# Patient Record
Sex: FEMALE | Race: OTHER | HISPANIC OR LATINO
[De-identification: names, ages, dates, MRNs, and addresses within clinical notes are randomized per-mention and may not be internally consistent; named-entity substitution may affect disease eponyms.]

---

## 2021-12-17 ENCOUNTER — APPOINTMENT (OUTPATIENT)
Dept: ANTEPARTUM | Facility: CLINIC | Age: 26
End: 2021-12-17
Payer: MEDICAID

## 2021-12-17 ENCOUNTER — APPOINTMENT (OUTPATIENT)
Dept: MATERNAL FETAL MEDICINE | Facility: CLINIC | Age: 26
End: 2021-12-17

## 2021-12-17 ENCOUNTER — ASOB RESULT (OUTPATIENT)
Age: 26
End: 2021-12-17

## 2021-12-17 VITALS
BODY MASS INDEX: 23.19 KG/M2 | SYSTOLIC BLOOD PRESSURE: 112 MMHG | HEIGHT: 62 IN | HEART RATE: 60 BPM | RESPIRATION RATE: 18 BRPM | WEIGHT: 126 LBS | OXYGEN SATURATION: 99 % | DIASTOLIC BLOOD PRESSURE: 66 MMHG

## 2021-12-17 DIAGNOSIS — Z78.9 OTHER SPECIFIED HEALTH STATUS: ICD-10-CM

## 2021-12-17 DIAGNOSIS — Z86.39 PERSONAL HISTORY OF OTHER ENDOCRINE, NUTRITIONAL AND METABOLIC DISEASE: ICD-10-CM

## 2021-12-17 DIAGNOSIS — Z92.89 PERSONAL HISTORY OF OTHER MEDICAL TREATMENT: ICD-10-CM

## 2021-12-17 PROBLEM — Z00.00 ENCOUNTER FOR PREVENTIVE HEALTH EXAMINATION: Status: ACTIVE | Noted: 2021-12-17

## 2021-12-17 PROCEDURE — 76801 OB US < 14 WKS SINGLE FETUS: CPT

## 2021-12-17 RX ORDER — PRENATAL VIT NO.126/IRON/FOLIC 28MG-0.8MG
28-0.8 TABLET ORAL
Refills: 0 | Status: ACTIVE | COMMUNITY

## 2021-12-27 ENCOUNTER — APPOINTMENT (OUTPATIENT)
Dept: ANTEPARTUM | Facility: CLINIC | Age: 26
End: 2021-12-27
Payer: MEDICAID

## 2021-12-27 ENCOUNTER — APPOINTMENT (OUTPATIENT)
Dept: MATERNAL FETAL MEDICINE | Facility: CLINIC | Age: 26
End: 2021-12-27
Payer: MEDICAID

## 2021-12-27 ENCOUNTER — ASOB RESULT (OUTPATIENT)
Age: 26
End: 2021-12-27

## 2021-12-27 VITALS
BODY MASS INDEX: 22.82 KG/M2 | RESPIRATION RATE: 18 BRPM | HEIGHT: 62 IN | DIASTOLIC BLOOD PRESSURE: 60 MMHG | OXYGEN SATURATION: 99 % | WEIGHT: 124 LBS | SYSTOLIC BLOOD PRESSURE: 100 MMHG | HEART RATE: 63 BPM

## 2021-12-27 DIAGNOSIS — Z64.1 PROBLEMS RELATED TO MULTIPARITY: ICD-10-CM

## 2021-12-27 DIAGNOSIS — O34.219 MATERNAL CARE FOR UNSPECIFIED TYPE SCAR FROM PREVIOUS CESAREAN DELIVERY: ICD-10-CM

## 2021-12-27 PROCEDURE — ZZZZZ: CPT

## 2021-12-27 PROCEDURE — 76813 OB US NUCHAL MEAS 1 GEST: CPT

## 2021-12-27 PROCEDURE — 99205 OFFICE O/P NEW HI 60 MIN: CPT

## 2021-12-27 RX ORDER — ASPIRIN ENTERIC COATED TABLETS 81 MG 81 MG/1
81 TABLET, DELAYED RELEASE ORAL
Qty: 30 | Refills: 4 | Status: ACTIVE | COMMUNITY
Start: 2021-12-27 | End: 1900-01-01

## 2021-12-27 NOTE — CHIEF COMPLAINT
[G ___] : G [unfilled] [P ___] : P [unfilled] [de-identified] : history of preeclampsia,  delivery and  deliveries

## 2021-12-27 NOTE — VITALS
[LMP (date): ___] : LMP was on [unfilled] [GA =___ Weeks] : which calculates to a GA of [unfilled] weeks [KIRSTIN by LMP (date): ___] : The calculated KIRSTIN by LMP is [unfilled] [By LMP] : this is the final KIRSTIN [GA= ___ Days] : and [unfilled] day(s)

## 2021-12-27 NOTE — OB HISTORY
[Pregnancy History] : boy [LMP: ___] : LMP: [unfilled] [KIRSTIN: ___] : KIRSTIN: [unfilled] [EGA: ___ wks] : EGA: [unfilled] wks [Spontaneous] : Spontaneous conception [Definite:  ___ (Date)] : the last menstrual period was [unfilled] [Normal Amount/Duration] : was of a normal amount and duration [Regular Cycle Intervals] : periods have been regular [___] : Delivery occurred at [unfilled] [FreeTextEntry1] : Prenatal records were not available for review.  [Spotting Between  Menses] : no spotting between menses

## 2021-12-27 NOTE — DISCUSSION/SUMMARY
[FreeTextEntry1] : She is 13 weeks and 2 day gestation by her last menstrual period dates.\par \par She has risk factors for having preeclampsia during pregnancy. I told her that having preeclampsia in a prior pregnancy has been associated with the recurrence of preeclampsia in future pregnancies.  I told her that the risk of having preeclampsia during the second pregnancy after having preeclampsia in the first pregnancy is approximately 15%.  I told her that taking a baby aspirin during pregnancy has been found to reduce the risk of developing preeclampsia,  delivery, and fetal growth restriction. She was advised to take one baby aspirin daily. I also told her that she can alternate taking a baby aspirin one day and two baby aspirins the next day. I also told her to stop taking the baby aspirin once the pregnancy reaches 37 weeks gestation.\par \par She had a  delivery after abdominal trauma in her native country of [place] [facility].  She subsequently had 2 full-term deliveries.  I told her that she is unlikely to have another  delivery since the cause of the  birth was attributed to abdominal trauma and it is unlikely to recur during the current pregnancy.  I informed her that spontaneous  births are multifactorial since various conditions such as  labor,  rupture of membranes, multiple gestations, abruptio placenta, placenta previa, vaginal bleeding, increased or decreased amniotic fluid volume, fetal anomalies, chorioamnionitis, and incompetent cervix are often associated with  births. The clinical risk factors most often associated with spontaneous  birth are genital tract infection, vaginal bleeding during the second trimester, multiple gestations, and a history of previous  birth.  There are four pathologic pathways that have been associated with  labor and delivery.  The four pathways are maternal infection, maternal or fetal stress, overdistention of the uterus, and decidual hemorrhage.  She was advised to not have sexual intercourse or to use a condom during intercourse to reduce the chance of having another  delivery. She was advised to limit her activities. I told her to avoid standing for prolonged periods of time, avoid heavy lifting and bending. She was also advised to avoid stress during the course of the pregnancy. I gave her  labor precautions. She was advised to present to the hospital if she developed pelvic pressure, back pain, copious vaginal discharge, leaking of vaginal fluid, vaginal bleeding, abdominal tightening, and abdominal cramping pain.\par \par She was informed of the availability of weekly progesterone injections to reduce her risk of having another premature infant. She was told that the progesterone injections did not provide complete protection against prematurity.  She was informed that the progesterone injections could produce mild side effects such as injection site soreness, swelling or bruising. I told her that the progesterone injections are usually started between 16 to 20 weeks of gestation and are continued up to 36 weeks of gestation or delivery. I also discussed the results of the Progestin's Role in Optimizing  Gestation (PROLONG) trial which was a double blind, placebo controlled, and international trial enrolling over 1,700 women. The rate of  births < 35 weeks did not differ between the progesterone and placebo groups (11% vs 11.5%; p = 0.7). The rate of  births < 37 weeks was also not significantly different between the progesterone and placebo groups (23.1% vs 21.9%). She was made aware that some medical insurance companies do not cover the cost of progesterone injections. She declined to receive the progesterone injections.  \par \par She had 3  sections and she was advised to have a repeat  section delivery.  I told her that multiple repeat  deliveries can result in serious maternal morbidity.  I told her that she is at risk for dense adhesions, more difficult surgery, placenta accreta, bowel injury, cystotomy, ureteral injury, ileus, hysterectomy, blood transfusions, intensive care unit admission, and  delivery.\par \par She is multiparous and we discussed the various methods available for contraception after delivery. She told me that she wants to have  an operative sterilization procedure after the repeat  delivery. \par \par She took the first dose of the Pfizer COVID-19 vaccine during 2021.  She did not take the second vaccine because she became pregnant and had concerns about taking the vaccine during pregnancy.  I told her that I recommend having the COVID-19 vaccine during pregnancy. She was informed that the American College of Obstetricians and Gynecologists (ACOG) and the Society for Maternal Fetal Medicine (SMFM) are recommending that all pregnant women be vaccinated against COVID-19. I discussed the benefits and risks of COVID-19 vaccination. I told her of the reported safety of the COVID-19 vaccines during pregnancy. There is no evidence of adverse maternal or fetal effects from vaccinating pregnant women with COVID-19 vaccine. I discussed the current low vaccination rates and the concerning increase in cases. The data has shown that COVID-19 infection puts pregnant women at increased risk of severe complications and death. More than 95% of hospitalized and/or dying individuals from COVID-19 are unvaccinated. Vaccines are the best chance there is to save lives and end the pandemic. Maternal vaccination may also provide some level of protection to the  through the placental transfer of maternal antibodies. I told her that mild side effects such as pain at the site of injection, fever, muscle pain, joint pain, headaches, fatigue, and other symptoms may be present after vaccination. Tylenol (acetaminophen) is recommended for pregnant women who experience fever or other side effects. These side effects are a normal part of the body´s reaction to the vaccine and the developing of antibodies to protect against the COVID-19 illness. None of the COVID-19 vaccines available in the [place] cause infertility. The [employer] COVID-19 vaccine has a small risk for thrombocytopenia syndrome (TTS) and Guillain-Barré syndrome. Therefore, I believe that vaccination with the mRNA vaccines is preferable. There are rare allergic reactions (including anaphylaxis), 4.7 per million for [employer] and 2.5 per million for Moderna following COVID-19 vaccination in nonpregnant individuals. Management of anaphylaxis in pregnant individuals is the same as in nonpregnant individuals. She told me that she is going to request to have the second COVID 19 vaccine as soon as possible.\par \par I told her that I recommend a glucola challenge test to screen for gestational diabetes between 24 and 28 weeks gestation.  I also recommended the Tdap vaccine between 27 and 36 weeks of gestation to prevent pertussis infection in the  infant. I recommended the flu vaccine during flu season (October through May). She told me that she already took the flu vaccine. I recommended having serial ultrasounds to evaluate fetal growth and development.

## 2021-12-27 NOTE — FAMILY HISTORY
[Age 35+ During Pregnancy] : not 35 or over during pregnancy [Reported Family History Of Birth Defects] : no congenital heart defects [Bam-Sachs Carrier] : no Bam-Sachs [Family History] : no mental retardation/autism [Reported Family History Of Genetic Disease] : no history of child defect in child of baby father

## 2022-01-03 LAB
1ST TRIMESTER DATA: NORMAL
ADDENDUM DOC: NORMAL
AFP PNL SERPL: NORMAL
AFP SERPL-ACNC: NORMAL
CLINICAL BIOCHEMIST REVIEW: NORMAL
FREE BETA HCG 1ST TRIMESTER: NORMAL
Lab: NORMAL
NASAL BONE: PRESENT
NOTES NTD: NORMAL
NT: NORMAL
PAPP-A SERPL-ACNC: NORMAL
TRISOMY 18/3: NORMAL

## 2022-01-24 ENCOUNTER — APPOINTMENT (OUTPATIENT)
Dept: ANTEPARTUM | Facility: CLINIC | Age: 27
End: 2022-01-24
Payer: MEDICAID

## 2022-01-24 PROCEDURE — 36415 COLL VENOUS BLD VENIPUNCTURE: CPT

## 2022-01-28 LAB
1ST TRIMESTER DATA: NORMAL
2ND TRIMESTER DATA: NORMAL
ADDENDUM DOC: NORMAL
AFP PNL SERPL: NORMAL
AFP SERPL-ACNC: NORMAL
AFP SERPL-ACNC: NORMAL
B-HCG FREE SERPL-MCNC: NORMAL
CLINICAL BIOCHEMIST REVIEW: NORMAL
FREE BETA HCG 1ST TRIMESTER: NORMAL
INHIBIN A SERPL-MCNC: NORMAL
NASAL BONE: PRESENT
NOTES NTD: NORMAL
NT: NORMAL
PAPP-A SERPL-ACNC: NORMAL
U ESTRIOL SERPL-SCNC: NORMAL

## 2022-02-16 ENCOUNTER — APPOINTMENT (OUTPATIENT)
Dept: MATERNAL FETAL MEDICINE | Facility: CLINIC | Age: 27
End: 2022-02-16
Payer: MEDICAID

## 2022-02-16 ENCOUNTER — APPOINTMENT (OUTPATIENT)
Dept: ANTEPARTUM | Facility: CLINIC | Age: 27
End: 2022-02-16
Payer: MEDICAID

## 2022-02-16 ENCOUNTER — ASOB RESULT (OUTPATIENT)
Age: 27
End: 2022-02-16

## 2022-02-16 VITALS
BODY MASS INDEX: 24.29 KG/M2 | OXYGEN SATURATION: 99 % | HEIGHT: 62 IN | HEART RATE: 74 BPM | WEIGHT: 132 LBS | RESPIRATION RATE: 18 BRPM | DIASTOLIC BLOOD PRESSURE: 60 MMHG | SYSTOLIC BLOOD PRESSURE: 110 MMHG

## 2022-02-16 DIAGNOSIS — Z71.85 ENCOUNTER FOR IMMUNIZATION SAFETY COUNSELING: ICD-10-CM

## 2022-02-16 DIAGNOSIS — O09.899 ABNORMAL HEMATOLOGICAL FINDING ON ANTENATAL SCREENING OF MOTHER: ICD-10-CM

## 2022-02-16 DIAGNOSIS — Z3A.20 20 WEEKS GESTATION OF PREGNANCY: ICD-10-CM

## 2022-02-16 DIAGNOSIS — O09.299 SUPERVISION OF PREGNANCY WITH OTHER POOR REPRODUCTIVE OR OBSTETRIC HISTORY, UNSPECIFIED TRIMESTER: ICD-10-CM

## 2022-02-16 DIAGNOSIS — O09.899 SUPERVISION OF OTHER HIGH RISK PREGNANCIES, UNSPECIFIED TRIMESTER: ICD-10-CM

## 2022-02-16 DIAGNOSIS — O28.0 ABNORMAL HEMATOLOGICAL FINDING ON ANTENATAL SCREENING OF MOTHER: ICD-10-CM

## 2022-02-16 DIAGNOSIS — O35.8XX0 MATERNAL CARE FOR OTHER (SUSPECTED) FETAL ABNORMALITY AND DAMAGE, NOT APPLICABLE OR UNSPECIFIED: ICD-10-CM

## 2022-02-16 PROCEDURE — 76817 TRANSVAGINAL US OBSTETRIC: CPT

## 2022-02-16 PROCEDURE — 99215 OFFICE O/P EST HI 40 MIN: CPT

## 2022-02-16 PROCEDURE — 76811 OB US DETAILED SNGL FETUS: CPT

## 2022-02-16 RX ORDER — GLUC/MSM/COLGN2/HYAL/ANTIARTH3 375-375-20
TABLET ORAL
Refills: 0 | Status: ACTIVE | COMMUNITY

## 2022-02-16 NOTE — DISCUSSION/SUMMARY
[FreeTextEntry1] : She is 20 weeks and 4 days gestation by her last menstrual period dates.\par \par She has risk factors for having preeclampsia during pregnancy. She has been taking a daily baby aspirin to reduce the risk of developing preeclampsia,  delivery, and fetal growth restriction.  I reminded her to stop taking the baby aspirin once the pregnancy reaches 37 weeks gestation.\par \par I discussed the significance of a low IZABELLA-A level. I told her that IZABELLA-A levels equal or less than the 5th percentile have been associated with a 2.7 fold increased risk for having a low birth weight infant. I also told her that there is a 2.4 fold increased risk for having a delivery before 34 weeks gestation, a 3.7 fold increased risk for having preeclampsia during pregnancy, a 3.3 fold increased risk for having a miscarriage or fetal loss before 24 weeks of gestation and a 1.9 fold increased risk for having a stillbirth or fetal loss at or after 24 weeks of gestation. I told her that  the transvaginal ultrasound examination done today to measure the cervical length was WNL; which is suggestive of a low risk for  labor/delivery. I previously gave her  labor precautions. I again advised her to not have sexual intercourse or to use a condom during intercourse to reduce the chance of having a  delivery. I also reminded her to avoid stress during the course of the pregnancy. I also advised her to continue her daily prenatal vitamins. I encouraged her to continue taking a baby aspirin daily to decreased the risk of developing preeclampsia,  delivery, and a small baby. I also recommended weekly testing of fetal well-being with BPPs/NSTs due to the increased risk for stillbirth starting at 36 weeks of gestation. I also recommended delivery during 39 weeks gestation if she has not given birth before 39 weeks to reduce the risk of late stillbirths. I recommended serial ultrasounds during the third trimester to monitor fetal growth.\par \par She took the first dose of the Pfizer COVID-19 vaccine during 2021.  She had not taken the second COVID-19 vaccine because she became pregnant and had concerns about taking the vaccine during pregnancy.  I recommended that she take the second COVID-19 vaccine during her prior [facility] visit.  She told me that she had the second COVID-19 vaccine on 2022. She also told me that she is going to request to have the COVID-19 vaccine booster in approximately 6 months.\par \par I told her that today's ultrasound examination reported left-sided fetal pyelectasis.  I told her that pyelectasis between 15 - 20 weeks of gestation is considered an ultrasound marker that has been associated with fetal chromosomal malformations such as Down syndrome. She was informed that ultrasound markers suggestive of fetal aneuploidy like pyelectasis are non-specific, most frequently seen in normal fetuses, and often transient. I told her that there were no major fetal structural malformations or other ultrasound markers suggestive of fetal aneuploidy seen during today's ultrasound examination. She was told that all pregnancies have a 2 to 3% risk of having a baby born with a birth defect. I discussed the various screening and diagnostic tests used for prenatal diagnosis.  I explained the difference between screening and diagnostic tests. I discussed the First Trimester Screen test and Sequential Screen test results. Both tests reported a low risk for fetal chromosomal malformations. She was made aware that prenatal diagnosis is available to determine whether the fetus she is carrying has normal or abnormal chromosomes. She was offered diagnostic testing with a genetic amniocentesis. She was made aware of the small risk of miscarriage associated with the diagnostic procedures, the limitations of the procedures, and the alternative of not having the procedures.  She decided to not have the genetic amniocentesis.

## 2022-02-16 NOTE — CHIEF COMPLAINT
[G ___] : G [unfilled] [P ___] : P [unfilled] [de-identified] : history of preeclampsia,  delivery and  deliveries

## 2022-02-16 NOTE — OB HISTORY
[Pregnancy History] : boy [___] : Delivery occurred at [unfilled] [LMP: ___] : LMP: [unfilled] [KIRSTIN: ___] : KIRSTIN: [unfilled] [Spontaneous] : Spontaneous conception [Definite:  ___ (Date)] : the last menstrual period was [unfilled] [Normal Amount/Duration] : was of a normal amount and duration [Regular Cycle Intervals] : periods have been regular [EGA: ___ wks] : EGA: [unfilled] wks [FreeTextEntry1] : Prenatal records were not available for review. \par \par She had a maternal-fetal medicine consultation on 2021 because of history of preeclampsia, history of  delivery, prior  deliveries, and multiparity.\par \par First trimester screening done on 2021 reported a low risk for Down syndrome and trisomy 18/13. The maternal IZABELLA A level was reported to be low at the 5th percentile. A sequential screen test done on 2022 reported a low risk for Down syndrome, trisomy 18, and open neural tube defects. [Spotting Between  Menses] : no spotting between menses

## 2022-02-16 NOTE — VITALS
[LMP (date): ___] : LMP was on [unfilled] [By LMP] : this is the final KIRSTIN [GA =___ Weeks] : which calculates to a GA of [unfilled] weeks [GA= ___ Days] : and [unfilled] day(s) [KIRSTIN by LMP (date): ___] : The calculated KIRSTIN by LMP is [unfilled]

## 2022-04-13 ENCOUNTER — APPOINTMENT (OUTPATIENT)
Dept: ANTEPARTUM | Facility: CLINIC | Age: 27
End: 2022-04-13
Payer: MEDICAID

## 2022-04-13 ENCOUNTER — ASOB RESULT (OUTPATIENT)
Age: 27
End: 2022-04-13

## 2022-04-13 PROCEDURE — 76820 UMBILICAL ARTERY ECHO: CPT

## 2022-04-13 PROCEDURE — 93976 VASCULAR STUDY: CPT

## 2022-04-13 PROCEDURE — 76816 OB US FOLLOW-UP PER FETUS: CPT

## 2022-05-25 ENCOUNTER — APPOINTMENT (OUTPATIENT)
Dept: ANTEPARTUM | Facility: CLINIC | Age: 27
End: 2022-05-25
Payer: MEDICAID

## 2022-05-25 ENCOUNTER — ASOB RESULT (OUTPATIENT)
Age: 27
End: 2022-05-25

## 2022-05-25 PROCEDURE — 76819 FETAL BIOPHYS PROFIL W/O NST: CPT

## 2022-05-25 PROCEDURE — 76820 UMBILICAL ARTERY ECHO: CPT

## 2022-05-25 PROCEDURE — 93976 VASCULAR STUDY: CPT

## 2022-05-25 PROCEDURE — 76816 OB US FOLLOW-UP PER FETUS: CPT
